# Patient Record
Sex: MALE | Race: WHITE
[De-identification: names, ages, dates, MRNs, and addresses within clinical notes are randomized per-mention and may not be internally consistent; named-entity substitution may affect disease eponyms.]

---

## 2020-06-18 ENCOUNTER — HOSPITAL ENCOUNTER (INPATIENT)
Dept: HOSPITAL 95 - ER | Age: 80
LOS: 2 days | Discharge: HOME | DRG: 291 | End: 2020-06-20
Attending: FAMILY MEDICINE | Admitting: FAMILY MEDICINE
Payer: MEDICARE

## 2020-06-18 VITALS — BODY MASS INDEX: 28.06 KG/M2 | WEIGHT: 195.99 LBS | HEIGHT: 70 IN

## 2020-06-18 DIAGNOSIS — J18.9: ICD-10-CM

## 2020-06-18 DIAGNOSIS — N18.4: ICD-10-CM

## 2020-06-18 DIAGNOSIS — Z66: ICD-10-CM

## 2020-06-18 DIAGNOSIS — I50.33: ICD-10-CM

## 2020-06-18 DIAGNOSIS — I13.0: Primary | ICD-10-CM

## 2020-06-18 DIAGNOSIS — Z95.5: ICD-10-CM

## 2020-06-18 DIAGNOSIS — I25.10: ICD-10-CM

## 2020-06-18 DIAGNOSIS — E11.65: ICD-10-CM

## 2020-06-18 DIAGNOSIS — J96.21: ICD-10-CM

## 2020-06-18 DIAGNOSIS — Z87.891: ICD-10-CM

## 2020-06-18 DIAGNOSIS — E11.22: ICD-10-CM

## 2020-06-18 DIAGNOSIS — I48.91: ICD-10-CM

## 2020-06-18 DIAGNOSIS — Z79.4: ICD-10-CM

## 2020-06-18 LAB
ALBUMIN SERPL BCP-MCNC: 2.8 G/DL (ref 3.4–5)
ALBUMIN/GLOB SERPL: 0.6 {RATIO} (ref 0.8–1.8)
ALT SERPL W P-5'-P-CCNC: 43 U/L (ref 12–78)
ANION GAP SERPL CALCULATED.4IONS-SCNC: 4 MMOL/L (ref 6–16)
AST SERPL W P-5'-P-CCNC: 26 U/L (ref 12–37)
BASOPHILS # BLD AUTO: 0.04 K/MM3 (ref 0–0.23)
BASOPHILS NFR BLD AUTO: 0 % (ref 0–2)
BILIRUB SERPL-MCNC: 0.5 MG/DL (ref 0.1–1)
BUN SERPL-MCNC: 49 MG/DL (ref 8–24)
CALCIUM SERPL-MCNC: 8.4 MG/DL (ref 8.5–10.1)
CHLORIDE SERPL-SCNC: 110 MMOL/L (ref 98–108)
CO2 SERPL-SCNC: 25 MMOL/L (ref 21–32)
CREAT SERPL-MCNC: 2.7 MG/DL (ref 0.6–1.2)
DEPRECATED RDW RBC AUTO: 51.8 FL (ref 35.1–46.3)
EOSINOPHIL # BLD AUTO: 0.17 K/MM3 (ref 0–0.68)
EOSINOPHIL NFR BLD AUTO: 2 % (ref 0–6)
ERYTHROCYTE [DISTWIDTH] IN BLOOD BY AUTOMATED COUNT: 15.3 % (ref 11.7–14.2)
GLOBULIN SER CALC-MCNC: 5 G/DL (ref 2.2–4)
GLUCOSE SERPL-MCNC: 325 MG/DL (ref 70–99)
HCT VFR BLD AUTO: 37.7 % (ref 37–53)
HGB BLD-MCNC: 11.2 G/DL (ref 13.5–17.5)
IMM GRANULOCYTES # BLD AUTO: 0.05 K/MM3 (ref 0–0.1)
IMM GRANULOCYTES NFR BLD AUTO: 1 % (ref 0–1)
LYMPHOCYTES # BLD AUTO: 0.7 K/MM3 (ref 0.84–5.2)
LYMPHOCYTES NFR BLD AUTO: 7 % (ref 21–46)
MCHC RBC AUTO-ENTMCNC: 29.7 G/DL (ref 31.5–36.5)
MCV RBC AUTO: 93 FL (ref 80–100)
MONOCYTES # BLD AUTO: 0.4 K/MM3 (ref 0.16–1.47)
MONOCYTES NFR BLD AUTO: 4 % (ref 4–13)
NEUTROPHILS # BLD AUTO: 8.57 K/MM3 (ref 1.96–9.15)
NEUTROPHILS NFR BLD AUTO: 86 % (ref 41–73)
NRBC # BLD AUTO: 0 K/MM3 (ref 0–0.02)
NRBC BLD AUTO-RTO: 0 /100 WBC (ref 0–0.2)
PH BLDA: 7.41 [PH] (ref 7.35–7.45)
PLATELET # BLD AUTO: 221 K/MM3 (ref 150–400)
POTASSIUM SERPL-SCNC: 5.1 MMOL/L (ref 3.5–5.5)
PROT SERPL-MCNC: 7.8 G/DL (ref 6.4–8.2)
SODIUM SERPL-SCNC: 139 MMOL/L (ref 136–145)
TROPONIN I SERPL-MCNC: 0.02 NG/ML (ref 0–0.04)

## 2020-06-19 LAB
ANION GAP SERPL CALCULATED.4IONS-SCNC: 5 MMOL/L (ref 6–16)
BASOPHILS # BLD AUTO: 0.01 K/MM3 (ref 0–0.23)
BASOPHILS NFR BLD AUTO: 0 % (ref 0–2)
BUN SERPL-MCNC: 52 MG/DL (ref 8–24)
CALCIUM SERPL-MCNC: 8.5 MG/DL (ref 8.5–10.1)
CHLORIDE SERPL-SCNC: 110 MMOL/L (ref 98–108)
CO2 SERPL-SCNC: 24 MMOL/L (ref 21–32)
CREAT SERPL-MCNC: 2.85 MG/DL (ref 0.6–1.2)
DEPRECATED RDW RBC AUTO: 51 FL (ref 35.1–46.3)
EOSINOPHIL # BLD AUTO: 0 K/MM3 (ref 0–0.68)
EOSINOPHIL NFR BLD AUTO: 0 % (ref 0–6)
ERYTHROCYTE [DISTWIDTH] IN BLOOD BY AUTOMATED COUNT: 15 % (ref 11.7–14.2)
GLUCOSE SERPL-MCNC: 324 MG/DL (ref 70–99)
GLUCOSE UR-MCNC: (no result) MG/DL
HCT VFR BLD AUTO: 37.3 % (ref 37–53)
HGB BLD-MCNC: 11.3 G/DL (ref 13.5–17.5)
IMM GRANULOCYTES # BLD AUTO: 0.03 K/MM3 (ref 0–0.1)
IMM GRANULOCYTES NFR BLD AUTO: 0 % (ref 0–1)
LYMPHOCYTES # BLD AUTO: 0.57 K/MM3 (ref 0.84–5.2)
LYMPHOCYTES NFR BLD AUTO: 7 % (ref 21–46)
MAGNESIUM SERPL-MCNC: 2.2 MG/DL (ref 1.6–2.4)
MCHC RBC AUTO-ENTMCNC: 30.3 G/DL (ref 31.5–36.5)
MCV RBC AUTO: 92 FL (ref 80–100)
MONOCYTES # BLD AUTO: 0.07 K/MM3 (ref 0.16–1.47)
MONOCYTES NFR BLD AUTO: 1 % (ref 4–13)
NEUTROPHILS # BLD AUTO: 7.03 K/MM3 (ref 1.96–9.15)
NEUTROPHILS NFR BLD AUTO: 91 % (ref 41–73)
NRBC # BLD AUTO: 0 K/MM3 (ref 0–0.02)
NRBC BLD AUTO-RTO: 0 /100 WBC (ref 0–0.2)
PLATELET # BLD AUTO: 217 K/MM3 (ref 150–400)
POTASSIUM SERPL-SCNC: 4.7 MMOL/L (ref 3.5–5.5)
PROT UR STRIP-MCNC: (no result) MG/DL
RBC #/AREA URNS HPF: (no result) /HPF (ref 0–2)
SODIUM SERPL-SCNC: 139 MMOL/L (ref 136–145)
SP GR SPEC: 1.01 (ref 1–1.02)
UROBILINOGEN UR STRIP-MCNC: (no result) MG/DL
WBC #/AREA URNS HPF: (no result) /HPF (ref 0–5)

## 2020-06-20 LAB
ANION GAP SERPL CALCULATED.4IONS-SCNC: 7 MMOL/L (ref 6–16)
BASOPHILS # BLD AUTO: 0.02 K/MM3 (ref 0–0.23)
BASOPHILS NFR BLD AUTO: 0 % (ref 0–2)
BUN SERPL-MCNC: 62 MG/DL (ref 8–24)
CALCIUM SERPL-MCNC: 8.4 MG/DL (ref 8.5–10.1)
CHLORIDE SERPL-SCNC: 106 MMOL/L (ref 98–108)
CO2 SERPL-SCNC: 25 MMOL/L (ref 21–32)
CREAT SERPL-MCNC: 3.09 MG/DL (ref 0.6–1.2)
DEPRECATED RDW RBC AUTO: 50.6 FL (ref 35.1–46.3)
EOSINOPHIL # BLD AUTO: 0.06 K/MM3 (ref 0–0.68)
EOSINOPHIL NFR BLD AUTO: 0 % (ref 0–6)
ERYTHROCYTE [DISTWIDTH] IN BLOOD BY AUTOMATED COUNT: 15.1 % (ref 11.7–14.2)
GLUCOSE SERPL-MCNC: 188 MG/DL (ref 70–99)
HCT VFR BLD AUTO: 35.5 % (ref 37–53)
HGB BLD-MCNC: 10.8 G/DL (ref 13.5–17.5)
IMM GRANULOCYTES # BLD AUTO: 0.08 K/MM3 (ref 0–0.1)
IMM GRANULOCYTES NFR BLD AUTO: 1 % (ref 0–1)
LYMPHOCYTES # BLD AUTO: 1.68 K/MM3 (ref 0.84–5.2)
LYMPHOCYTES NFR BLD AUTO: 10 % (ref 21–46)
MAGNESIUM SERPL-MCNC: 2.1 MG/DL (ref 1.6–2.4)
MCHC RBC AUTO-ENTMCNC: 30.4 G/DL (ref 31.5–36.5)
MCV RBC AUTO: 92 FL (ref 80–100)
MONOCYTES # BLD AUTO: 1.41 K/MM3 (ref 0.16–1.47)
MONOCYTES NFR BLD AUTO: 9 % (ref 4–13)
NEUTROPHILS # BLD AUTO: 13.15 K/MM3 (ref 1.96–9.15)
NEUTROPHILS NFR BLD AUTO: 80 % (ref 41–73)
NRBC # BLD AUTO: 0 K/MM3 (ref 0–0.02)
NRBC BLD AUTO-RTO: 0 /100 WBC (ref 0–0.2)
PLATELET # BLD AUTO: 208 K/MM3 (ref 150–400)
POTASSIUM SERPL-SCNC: 4.6 MMOL/L (ref 3.5–5.5)
SODIUM SERPL-SCNC: 138 MMOL/L (ref 136–145)

## 2020-06-20 NOTE — NUR
Echocardiogram completed.
NIGHT SHIFT SUMMARY
PT AAOX4 AND PLEASANT. INDEPENDENT TO SIDE OF BED TO USE URINAL. HR TRENDING
UP LAST PART OF SHIFT AND 'S PER TELE TECH. NOTIFIED DR YOON WHO
GAVE ORDER TO GIVE 5 MG IV LOPRESSOR AS WELL AS TO GIVE PT CARDIZEM AND PO
METOPROLOL EARLY. OTHER VSS. REPORT GIVEN TO ONCOMING RN.
PT DCD HOME WITH FAMILY. MED REC FAXED TO PHARMACY ON FILE. ALL
MEDS/INSTRUCTIONS REVIEWED WITH PT AND LYDIA AND ALL QUESTIONS ANSWERED. ALL
PERSONAL BELONGINGS SENT WITH PT. LYDIA WILL CALL AND SCHEDULE F/U APPTS.
PT STABLE UPON DC.
SHIFT SUMMARY
PT SLEPT WELL THIS EVENING. DENIES ANY SOB, TITRATED DOWN TO 1 L WITH O2 SATS
IN THE MID 90'S. ATTEMPTING TO WEAN COMPLETELY OFF OF O2 THIS AM. PT VOIDING
WELL WITH NEARLY 900 ML OUT THIS SHIFT. TELE AFIB 90'S THIS EVENING. RATE
BUMPS UP TO 'S WHEN PT IS UP AMBULATING BUT RETURNS DOWN IN TO THE
90'S ONCE RESTING AGAIN. OTHERWISE NO ACUTE CHANGES THIS SHIFT. WILL CONTINUE
TO MONITOR AND REPORT TO DAY RN.
SHIFT SUMMARY:
NO ACUTE CHANGES TO REPORT THIS SHIFT. PT A&0;CALM AND COOPERATIVE WITH CARE.
NO C/O PAIN/NAUSEA THIS SHIFT. CONSULTS TODAY FROM PULMONOLOGY, NEPHROLOGY,
AND CARDIOLOGY. TELE IN PLACE; A-FIB IN 120s; HR UP TO 140s WHEN OOB. O2 @ 2L
FOR COMFORT; ROOM AIR AT HOME. IV ABX CONTINUING. REPORT GIVEN TO ONCOMING RN.
SHIFT SUMMARY:
PATIENT ADMIT (INPATIENT) FROM ED THIS SHIFT. PT A&O; CALM AND COOPERATIVE
WITH CARE. NO C/O PAIN SINCE ARRIVAL ON MEDICAL. O2 @ 2L FOR COMFORT; SOB WITH
EXERTION; PATIENT OF PULMONOLOGY (DR SANCHEZ). IV STEROIDS & DIURESIS
CONTINUING. REPORT GIVEN TO ONCOMING RN.
English

## 2021-02-03 ENCOUNTER — HOSPITAL ENCOUNTER (INPATIENT)
Dept: HOSPITAL 95 - ER | Age: 81
LOS: 2 days | Discharge: HOME | DRG: 186 | End: 2021-02-05
Attending: INTERNAL MEDICINE | Admitting: INTERNAL MEDICINE
Payer: MEDICARE

## 2021-02-03 VITALS — BODY MASS INDEX: 27.27 KG/M2 | HEIGHT: 70 IN | WEIGHT: 190.48 LBS

## 2021-02-03 DIAGNOSIS — R64: ICD-10-CM

## 2021-02-03 DIAGNOSIS — I48.0: ICD-10-CM

## 2021-02-03 DIAGNOSIS — Z20.822: ICD-10-CM

## 2021-02-03 DIAGNOSIS — Z87.891: ICD-10-CM

## 2021-02-03 DIAGNOSIS — D50.9: ICD-10-CM

## 2021-02-03 DIAGNOSIS — I25.10: ICD-10-CM

## 2021-02-03 DIAGNOSIS — Z66: ICD-10-CM

## 2021-02-03 DIAGNOSIS — J90: Primary | ICD-10-CM

## 2021-02-03 DIAGNOSIS — E11.22: ICD-10-CM

## 2021-02-03 DIAGNOSIS — K21.9: ICD-10-CM

## 2021-02-03 DIAGNOSIS — E03.9: ICD-10-CM

## 2021-02-03 DIAGNOSIS — I50.22: ICD-10-CM

## 2021-02-03 DIAGNOSIS — N18.4: ICD-10-CM

## 2021-02-03 DIAGNOSIS — E43: ICD-10-CM

## 2021-02-03 DIAGNOSIS — Z79.02: ICD-10-CM

## 2021-02-03 DIAGNOSIS — J44.9: ICD-10-CM

## 2021-02-03 DIAGNOSIS — Z79.4: ICD-10-CM

## 2021-02-03 DIAGNOSIS — E87.5: ICD-10-CM

## 2021-02-03 LAB
ALBUMIN SERPL BCP-MCNC: 2.9 G/DL (ref 3.4–5)
ALBUMIN/GLOB SERPL: 0.5 {RATIO} (ref 0.8–1.8)
ALT SERPL W P-5'-P-CCNC: 22 U/L (ref 12–78)
ANION GAP SERPL CALCULATED.4IONS-SCNC: 9 MMOL/L (ref 6–16)
AST SERPL W P-5'-P-CCNC: 16 U/L (ref 12–37)
BASOPHILS # BLD AUTO: 0.08 K/MM3 (ref 0–0.23)
BASOPHILS NFR BLD AUTO: 1 % (ref 0–2)
BILIRUB SERPL-MCNC: 0.6 MG/DL (ref 0.1–1)
BUN SERPL-MCNC: 58 MG/DL (ref 8–24)
CALCIUM SERPL-MCNC: 8.9 MG/DL (ref 8.5–10.1)
CHLORIDE SERPL-SCNC: 107 MMOL/L (ref 98–108)
CO2 SERPL-SCNC: 23 MMOL/L (ref 21–32)
CREAT SERPL-MCNC: 2.96 MG/DL (ref 0.6–1.2)
DEPRECATED RDW RBC AUTO: 46.8 FL (ref 35.1–46.3)
EOSINOPHIL # BLD AUTO: 0.23 K/MM3 (ref 0–0.68)
EOSINOPHIL NFR BLD AUTO: 2 % (ref 0–6)
ERYTHROCYTE [DISTWIDTH] IN BLOOD BY AUTOMATED COUNT: 13.4 % (ref 11.7–14.2)
GLOBULIN SER CALC-MCNC: 5.7 G/DL (ref 2.2–4)
GLUCOSE SERPL-MCNC: 271 MG/DL (ref 70–99)
HCT VFR BLD AUTO: 36.6 % (ref 37–53)
HGB BLD-MCNC: 11.1 G/DL (ref 13.5–17.5)
IMM GRANULOCYTES # BLD AUTO: 0.03 K/MM3 (ref 0–0.1)
IMM GRANULOCYTES NFR BLD AUTO: 0 % (ref 0–1)
LYMPHOCYTES # BLD AUTO: 0.96 K/MM3 (ref 0.84–5.2)
LYMPHOCYTES NFR BLD AUTO: 9 % (ref 21–46)
MCHC RBC AUTO-ENTMCNC: 30.3 G/DL (ref 31.5–36.5)
MCV RBC AUTO: 94 FL (ref 80–100)
MONOCYTES # BLD AUTO: 1.17 K/MM3 (ref 0.16–1.47)
MONOCYTES NFR BLD AUTO: 11 % (ref 4–13)
NEUTROPHILS # BLD AUTO: 7.81 K/MM3 (ref 1.96–9.15)
NEUTROPHILS NFR BLD AUTO: 76 % (ref 41–73)
NRBC # BLD AUTO: 0 K/MM3 (ref 0–0.02)
NRBC BLD AUTO-RTO: 0 /100 WBC (ref 0–0.2)
PLATELET # BLD AUTO: 259 K/MM3 (ref 150–400)
POTASSIUM SERPL-SCNC: 5.6 MMOL/L (ref 3.5–5.5)
PROT SERPL-MCNC: 8.6 G/DL (ref 6.4–8.2)
SODIUM SERPL-SCNC: 139 MMOL/L (ref 136–145)
TROPONIN I SERPL-MCNC: 0.02 NG/ML (ref 0–0.04)

## 2021-02-03 PROCEDURE — A9270 NON-COVERED ITEM OR SERVICE: HCPCS

## 2021-02-03 PROCEDURE — G0378 HOSPITAL OBSERVATION PER HR: HCPCS

## 2021-02-03 NOTE — NUR
PT ARRIVED TO ICU 3 AT 1850 PCU STATUS. PT ABLE TO STAND AND PIVOT TO BED. PT
IS A/O X4. SPO2 96% ON RA, PT GETS SOB WITH EXERTION AND DOES PURSED LIP
BREATHING.  URINAL PROVIDED AT BEDSIDE.  FLUSHED IV AND RE-DRESSED IT. REPORT
GIVEN TO ONCOMING RN AT BEDSIDE. CALL LIGHT IN REACH.

## 2021-02-03 NOTE — NUR
ASSUMED CARE OF PATIENT AT APPROXIMATELY 1900 FROM KEKE FORD RN.  PATIENT
ALERT AND ORIENTED X4; WEAKNESS NOTED; PATIENT HAD DYSPNEA W/ ANY ACTIVITY.
PATIENT DENIES PAIN, DIZZINESS OR NAUSEA.  AFIB 110-130'S ON HEART MONITOR;
OXYGEN SATURATION ABOVE 90% ON ROOM AIR; PATIENT REPORTS HE USES 2LPM VIA NC
AT HOME PRN AT NIGHT WHEN HIS SATS ARE IN THE 80'S.  ADMISSION COMPLETE.  PIV
S/L.  USES URINAL IN BED.
PATIENT CURRENTLY RESTING IN BED; CALL LIGHT IN REACH; BED IN LOWEST
POSISTION; BED ALARM ON.

## 2021-02-03 NOTE — NUR
[place] BARLOW ROUNDING IN UNIT; UPDATED ON PATIENT'S HEART RATE MAINTAINING IN
130'S AFIB SINCE SHIFT CHANGE.  PATIENT DENIES PAIN BUT REPORTS DYSPNEA W/ ANY
MOVEMENT; EVEN USING URINAL IN BED; REFUSED CONDOM CATH; PO METOPROLOL GIVEN
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

## 2021-02-03 NOTE — NUR
PATIENT'S HOME MEDICATION LIST SHOWS 30 UNIT OF LONG ACTING INSULIN VS 40
UNITS WHICH IS SCHEDULED; NP CAIN UPDATED; ONE TIME ORDER TO GIVE 30 UNITS
AND HOLD SCHEDULED DOSE AND ALLOW DAYSHIFT TO ADDRESS CHANGING DOSE.

## 2021-02-04 LAB
ALBUMIN SERPL BCP-MCNC: 2.6 G/DL (ref 3.4–5)
ALBUMIN/GLOB SERPL: 0.5 {RATIO} (ref 0.8–1.8)
ALT SERPL W P-5'-P-CCNC: 22 U/L (ref 12–78)
ANION GAP SERPL CALCULATED.4IONS-SCNC: 8 MMOL/L (ref 6–16)
AST SERPL W P-5'-P-CCNC: 15 U/L (ref 12–37)
BILIRUB SERPL-MCNC: 0.5 MG/DL (ref 0.1–1)
BUN SERPL-MCNC: 55 MG/DL (ref 8–24)
CALCIUM SERPL-MCNC: 8.5 MG/DL (ref 8.5–10.1)
CHLORIDE SERPL-SCNC: 110 MMOL/L (ref 98–108)
CO2 SERPL-SCNC: 23 MMOL/L (ref 21–32)
CREAT SERPL-MCNC: 3.01 MG/DL (ref 0.6–1.2)
EOSINOPHIL NFR FLD MANUAL: 2 % (ref 0–10)
GLOBULIN SER CALC-MCNC: 4.9 G/DL (ref 2.2–4)
GLUCOSE SERPL-MCNC: 190 MG/DL (ref 70–99)
LDH SERPL-CCNC: 155 U/L (ref 100–240)
LDH SPEC-CCNC: 203 U/L
LYMPHOCYTES NFR FLD MANUAL: 81 % (ref 0–18)
MAGNESIUM SERPL-MCNC: 2.1 MG/DL (ref 1.6–2.4)
MONONUC CELLS NFR FLD MANUAL: 4 % (ref 0–50)
NEUTS SEG NFR FLD MANUAL: 13 % (ref 0–25)
POTASSIUM SERPL-SCNC: 4.7 MMOL/L (ref 3.5–5.5)
PROT SERPL-MCNC: 7.5 G/DL (ref 6.4–8.2)
PROTHROMBIN TIME: 11.3 SEC (ref 9.7–11.5)
RBC # FLD MANUAL: (no result) /MM3 (ref 0–0)
RBC # FLD MANUAL: 0.04 M/MM3 (ref 0–0)
SODIUM SERPL-SCNC: 141 MMOL/L (ref 136–145)
TOTAL CELLS COUNTED SPEC: 100
WBC # FLD AUTO: 2.28 K/MM3 (ref 0–999)

## 2021-02-04 PROCEDURE — 0W993ZZ DRAINAGE OF RIGHT PLEURAL CAVITY, PERCUTANEOUS APPROACH: ICD-10-PCS | Performed by: RADIOLOGY

## 2021-02-04 NOTE — NUR
Nic denies difficulty breathing, however noted that he does become
significantly short of breath with minimal acitivity and heart rate is 120s
with moving around/repositioning while still in bed.  States he cannot feel
his heart beating rapidly.  About an hour after admnistration of morning
metoprolol and cardizem, heart rate decreased from 120s to 110s.

## 2021-02-04 NOTE — NUR
ASSUMED CARE OF PATIENT. HE IS A VERY PLEASANT ELDERLY MAN WHO IS RESTING
COMFORTABLY IN BED AND DENIES PAIN OR SOB. VS WNL, THORACENTESIS SITE WITH
BANDAID IN PLACE, NO BRUISING. PAS REPLACED.

## 2021-02-04 NOTE — NUR
Pt returned from radiology after thoracentesis.  LUng sounds auscultated in
all fields.  Pt states that his chest was very painful after the
thoracentesis.  STates they got a lot of fluid off of his lung, and that they
said that there was still more there.  Pt states that his breathing is feeling
better now.  Vital signs taken , documented.

## 2021-02-04 NOTE — NUR
summary
The pt slept for most of the morning and into the afternoon unless getting
staff visits or care.  He had a thoracentesis this afternoon, and returned to
the room, stating that he had had terrific pain in the radiology dept just
following the completion of the drain.  At the time of his return to ICU3 he
stated that his breathing felt better and was no longer having pain.  Site on
the right back noted to be without bruising, swelling, bleeding nor drainage
and covered with a band-aid.  Lung sounds auscultated throughout the lung
fields.  Heart rate was somewhat improved, at 105 bpm.
About an hour later he was slowly getting up to go to the recliner chair, but
he had sudden difficulty breathing and pain in his right side.  Spo2 remained
95-96%, and heart rate was about 70 /minute, some dyspnea noted but the
discomfort and dyspnea resolved after he lay back in bed, with HOB elevated.
Lung sounds remained unchanged from initial assessment following return from
thoracentesis. He then stated that his breathing felt fine again.
Sat up in bed, ate dinner without incident this evening.

## 2021-02-05 LAB
ANION GAP SERPL CALCULATED.4IONS-SCNC: 6 MMOL/L (ref 6–16)
BASOPHILS # BLD AUTO: 0.05 K/MM3 (ref 0–0.23)
BASOPHILS NFR BLD AUTO: 1 % (ref 0–2)
BUN SERPL-MCNC: 62 MG/DL (ref 8–24)
CALCIUM SERPL-MCNC: 8.5 MG/DL (ref 8.5–10.1)
CHLORIDE SERPL-SCNC: 107 MMOL/L (ref 98–108)
CO2 SERPL-SCNC: 24 MMOL/L (ref 21–32)
CREAT SERPL-MCNC: 3.42 MG/DL (ref 0.6–1.2)
DEPRECATED RDW RBC AUTO: 45.8 FL (ref 35.1–46.3)
EOSINOPHIL # BLD AUTO: 0.34 K/MM3 (ref 0–0.68)
EOSINOPHIL NFR BLD AUTO: 4 % (ref 0–6)
ERYTHROCYTE [DISTWIDTH] IN BLOOD BY AUTOMATED COUNT: 13.5 % (ref 11.7–14.2)
GLUCOSE SERPL-MCNC: 257 MG/DL (ref 70–99)
HCT VFR BLD AUTO: 33.1 % (ref 37–53)
HGB BLD-MCNC: 10.2 G/DL (ref 13.5–17.5)
IMM GRANULOCYTES # BLD AUTO: 0.03 K/MM3 (ref 0–0.1)
IMM GRANULOCYTES NFR BLD AUTO: 0 % (ref 0–1)
LYMPHOCYTES # BLD AUTO: 1.32 K/MM3 (ref 0.84–5.2)
LYMPHOCYTES NFR BLD AUTO: 14 % (ref 21–46)
MCHC RBC AUTO-ENTMCNC: 30.8 G/DL (ref 31.5–36.5)
MCV RBC AUTO: 93 FL (ref 80–100)
MONOCYTES # BLD AUTO: 1.19 K/MM3 (ref 0.16–1.47)
MONOCYTES NFR BLD AUTO: 12 % (ref 4–13)
NEUTROPHILS # BLD AUTO: 6.67 K/MM3 (ref 1.96–9.15)
NEUTROPHILS NFR BLD AUTO: 70 % (ref 41–73)
NRBC # BLD AUTO: 0 K/MM3 (ref 0–0.02)
NRBC BLD AUTO-RTO: 0 /100 WBC (ref 0–0.2)
PLATELET # BLD AUTO: 225 K/MM3 (ref 150–400)
POTASSIUM SERPL-SCNC: 4.6 MMOL/L (ref 3.5–5.5)
SODIUM SERPL-SCNC: 137 MMOL/L (ref 136–145)

## 2021-02-05 NOTE — NUR
DISCHARGE INSTRUCTIONS, MEDICATION CHANGES AND FOLLOW UP GONE OVER WITH PT AND
FAMILY. ALL QUESTIONS ANSWERED. BELONGINGS GATHERED AND GIVEN TO PT. PT
ESCORTED OUT VIA WHEELCHAIR TO AWAITING RIDE.

## 2021-02-05 NOTE — NUR
PT SLEEPS SOUNDLY MOST OF THE NIGHT WITH SHORT INTERRUPTIONS FOR CARE. SPO2
STAYS IN MID 90'S ON RA. PT REPORTS SOME SORENESS AROUND THORACENTESIS SITE,
BUT NO OTHER COMPLAINTS. HE STATES HIS BREATHING IS SO MUCH BETTER SINCE THE
FLUID WAS TAKEN OFF.

## 2021-02-23 ENCOUNTER — HOSPITAL ENCOUNTER (OUTPATIENT)
Dept: HOSPITAL 95 - US | Age: 81
Discharge: HOME | End: 2021-02-23
Attending: INTERNAL MEDICINE
Payer: MEDICARE

## 2021-02-23 DIAGNOSIS — Z20.822: ICD-10-CM

## 2021-02-23 DIAGNOSIS — Z01.812: Primary | ICD-10-CM

## 2021-02-23 DIAGNOSIS — J90: ICD-10-CM

## 2021-02-23 LAB
LYMPHOCYTES NFR FLD MANUAL: 79 % (ref 0–18)
MONONUC CELLS NFR FLD MANUAL: 16 % (ref 0–50)
NEUTS SEG NFR FLD MANUAL: 3 % (ref 0–25)
RBC # FLD MANUAL: (no result) /MM3 (ref 0–0)
RBC # FLD MANUAL: 0.02 M/MM3 (ref 0–0)
TOTAL CELLS COUNTED SPEC: 100
WBC # FLD AUTO: 1.85 K/MM3 (ref 0–999)